# Patient Record
Sex: MALE | Race: WHITE
[De-identification: names, ages, dates, MRNs, and addresses within clinical notes are randomized per-mention and may not be internally consistent; named-entity substitution may affect disease eponyms.]

---

## 2017-01-03 ENCOUNTER — HOSPITAL ENCOUNTER (OUTPATIENT)
Dept: HOSPITAL 53 - M LAB REF | Age: 22
End: 2017-01-03
Attending: PHYSICIAN ASSISTANT
Payer: COMMERCIAL

## 2017-01-03 DIAGNOSIS — J02.9: Primary | ICD-10-CM

## 2020-12-30 ENCOUNTER — HOSPITAL ENCOUNTER (OUTPATIENT)
Dept: HOSPITAL 53 - M LAB | Age: 25
End: 2020-12-30
Attending: PSYCHIATRY & NEUROLOGY
Payer: COMMERCIAL

## 2020-12-30 DIAGNOSIS — G35: Primary | ICD-10-CM

## 2020-12-30 LAB
FOLATE SERPL-MCNC: 13.5 NG/ML
HBV SURFACE AB SER QL: NEGATIVE
HCV AB SER QL: 0.1 INDEX (ref ?–0.8)
RHEUMATOID FACT SERPL-ACNC: < 10 IU/ML (ref ?–15)
VIT B12 SERPL-MCNC: 310 PG/ML

## 2020-12-31 LAB
DRVVT CONFIRM PPP QL: 37.3 SEC
DRVVT SCREEN TO CONFIRM RATIO: 0.9 {RATIO} (ref 0–1.2)

## 2021-02-09 ENCOUNTER — HOSPITAL ENCOUNTER (OUTPATIENT)
Dept: HOSPITAL 53 - M PAIN | Age: 26
End: 2021-02-09
Attending: ANESTHESIOLOGY
Payer: COMMERCIAL

## 2021-02-09 DIAGNOSIS — F17.210: ICD-10-CM

## 2021-02-09 DIAGNOSIS — G35: Primary | ICD-10-CM

## 2021-02-09 DIAGNOSIS — G43.709: ICD-10-CM

## 2021-02-19 ENCOUNTER — HOSPITAL ENCOUNTER (OUTPATIENT)
Dept: HOSPITAL 53 - M LABSMTC | Age: 26
End: 2021-02-19
Attending: ANESTHESIOLOGY
Payer: COMMERCIAL

## 2021-02-19 DIAGNOSIS — Z20.822: ICD-10-CM

## 2021-02-19 DIAGNOSIS — Z01.812: Primary | ICD-10-CM

## 2021-02-24 ENCOUNTER — HOSPITAL ENCOUNTER (OUTPATIENT)
Dept: HOSPITAL 53 - M PAIN | Age: 26
End: 2021-02-24
Attending: ANESTHESIOLOGY
Payer: COMMERCIAL

## 2021-02-24 ENCOUNTER — HOSPITAL ENCOUNTER (OUTPATIENT)
Dept: HOSPITAL 53 - M LAB | Age: 26
End: 2021-02-24
Attending: FAMILY MEDICINE
Payer: COMMERCIAL

## 2021-02-24 DIAGNOSIS — G43.709: ICD-10-CM

## 2021-02-24 DIAGNOSIS — F17.210: ICD-10-CM

## 2021-02-24 DIAGNOSIS — G35: Primary | ICD-10-CM

## 2021-02-24 DIAGNOSIS — Z79.899: ICD-10-CM

## 2021-02-24 LAB
APPEARANCE CSF: CLEAR
COLOR CSF: COLORLESS
GLUCOSE CSF-MCNC: 57 MG/DL (ref 40–75)
PROT CSF-MCNC: 34 MG/DL (ref 15–45)
TUBE # CSF: (no result)

## 2021-02-24 PROCEDURE — 83916 OLIGOCLONAL BANDS: CPT

## 2021-02-24 PROCEDURE — 87205 SMEAR GRAM STAIN: CPT

## 2021-02-24 PROCEDURE — 99152 MOD SED SAME PHYS/QHP 5/>YRS: CPT

## 2021-02-24 PROCEDURE — 88313 SPECIAL STAINS GROUP 2: CPT

## 2021-02-24 PROCEDURE — 87102 FUNGUS ISOLATION CULTURE: CPT

## 2021-02-24 PROCEDURE — 89050 BODY FLUID CELL COUNT: CPT

## 2021-02-24 PROCEDURE — 87070 CULTURE OTHR SPECIMN AEROBIC: CPT

## 2021-02-24 PROCEDURE — 87252 VIRUS INOCULATION TISSUE: CPT

## 2021-02-24 PROCEDURE — 87483 CNS DNA AMP PROBE TYPE 12-25: CPT

## 2021-02-24 PROCEDURE — 62270 DX LMBR SPI PNXR: CPT

## 2021-02-24 PROCEDURE — 82945 GLUCOSE OTHER FLUID: CPT

## 2021-02-24 PROCEDURE — 82784 ASSAY IGA/IGD/IGG/IGM EACH: CPT

## 2021-02-24 PROCEDURE — 84157 ASSAY OF PROTEIN OTHER: CPT

## 2021-02-24 PROCEDURE — 88108 CYTOPATH CONCENTRATE TECH: CPT

## 2021-02-24 PROCEDURE — 99153 MOD SED SAME PHYS/QHP EA: CPT

## 2021-02-25 NOTE — ECWPNPC
PATIENT NAME: NOEL MIKE

: 1995

GENDER: MALE

MRN: [id number]

VISIT DATE: 2021

DISCHARGE DATE: 21 1507

VISIT LOCKED DATE TIME: 

PHYSICIAN: HALEY POTTER MD

RESOURCE: HALEY POTTER MD

 

           

           

REASON FOR APPOINTMENT

           

          1. SPINAL TAP

           

HISTORY OF PRESENT ILLNESS

           

      GENERAL:

           -.

           

      FALL RISK SCREENING:

      SCREENING

           

           

          :NO FALLS REPORTED IN THE LAST YEAR

           

      PAIN SCREENING:

      PATIENT HAS A COMPLAINT OF ACUTE OR CHRONIC PAIN

           

           

          :YES

          LOCATION OF PAIN:LOW BACK

          INTENSITY OF PAIN (SCALE OF 1 TO 10):4

          WHAT DOES YOUR PAIN FEEL LIKE:ACHING, CONTINOUS, SORE

          DURATION:CONTINOUS, CONSTANT

          PAIN IS INCREASED BY:ACTIVITIES

          PAIN IS DECREASED BY:USE OF PAIN MEDICATIONS MARIJUANA

           

      NURSING NOTE:

           -.

           

      PAIN CENTER INTAKE QUESTIONS:

      DO YOU HAVE A HISTORY OF MRSA?

           

           

          :NO

           

      DO YOU TAKE A BLOOD THINNERS?

           

           

          :NO

           

      DO YOU HAVE ANY BLEEDING DISORDERS?

           

           

          :NO

           

      ANY NEW NUMBNESS OR WEAKNESS IN YOUR LEGS OR ARMS?

           

           

          :NO

           

      ANY PACEMAKER,DEFIBRILLATOR, OR DORSAL COLUMN

          STIMULATOR?

           

           

          :NO

           

      DO YOU HAVE ANY RASHES OR OPEN SORES?

           

           

          :NO

           

      ARE YOU ALLERGIC TO IV DYE?

           

           

          :NO

           

      ARE YOU DIABETIC?

           

           

          :NO

           

      ANY NEW PROBLEMS WITH YOUR MEDICATIONS?

           

           

          :NO

           

      HAVE YOU RECEIVED A VACCINE IN THE PAST 30 DAYS?

           

           

          :NO

           

      DO YOU PLAN TO RECEIVE A VACCINE IN THE NEXT 21

          DAYS?

           

           

          :NO

           

      DO YOU TAKE ANY IMMUNOSUPPRESSIVE MEDICATIONS?

           

           

          :NO

           

      ANY HISTORY OF SEIZURES?

           

           

          :NO

           

      ANY HISTORY OF CARDIAC ISSUES OR EVENTS?

           

           

          :NO

           

      DO YOU HAVE SLEEP APNEA?

           

           

          :NO

           

      ANY RECENT HEAD INJURY?

           

           

          :NO

           

      DO YOU HAVE ANY NEW INFECTIONS?

           

           

          :NO

           

      IS THERE A CHANCE YOU COULD BE PREGNANT?

           

           

          :NO

           

      ARE YOU BREAST FEEDING?

           

           

          :NO

           

      WHEN DID YOU LAST EAT?

           

           

          : -2200

           

      WHEN DID YOU LAST DRINK?

           

           

          : - 1000

           

      WHAT DID YOU LAST DRINK?

           

           

          : -APPLE JUICE

           

      NAME OF PERSON DRIVING YOU HOME?

           

           

          : -BROTHER FAM

           

      DO YOU HAVE ANY OTHER QUESTIONS OR CONCERNS?

           

           

          : -

           

CURRENT MEDICATIONS

           

          TAKING EXCEDRIN MIGRAINE 250-250-65 MG TABLET 2 TABLETS ORALLY

          ONCE A DAY

          NOT-TAKING TOPAMAX 50 MG TABLET 1 TABLET ORALLY ONCE A DAY

           

PAST MEDICAL HISTORY

           

          SPINAL MENINGITIS

          MIGRAINES

           

ALLERGIES

           

          N.K.D.A.

           

SURGICAL HISTORY

           

          OBJECT REMOVED FROM EAR AS A CHILD

           

FAMILY HISTORY

           

          FATHER: 

          MOTHER: ALIVE, DIAGNOSED WITH HYPERTENSION

          2 BROTHER(S) - HEALTHY.

           

SOCIAL HISTORY

           

          GENERAL:

           

          TOBACCO USE

          ARE YOU A:CURRENT SMOKER

          HOW MANY CIGARETTES A DAY DO YOU SMOKE?11-20

          ARE YOU INTERESTED IN QUITTING?NOT READY TO QUIT

          COUNSELED THE PATIENT ON SMOKING EFFECTS, EDUCATION

          [id number]/09/2021

           

           

          LATEX QUESTIONNAIRE

          LATEX ALLERGY : HAVE YOU EVER DEVELOPED ANY TYPE OF REACTION

          AFTER HANDLING LATEX PRODUCTS SUCH AS RUBBER GLOVES, CONDOMS,

          DIAPHRAGMS, BALLOONS, SOCKS, OR UNDERWEAR?NO

          LATEX ALLERGY : HAVE YOU EVER DEVELOPED ANY TYPE OF REACTION

          DURING OR AFTER DENTAL APPOINTMENT, VAGINAL/RECTAL EXAMINATION,

          SURGICAL PROCEDURE, OR ANY OTHER EXPOSURE?NO

          LATEX RISK : HAVE YOU EVER HAD ANY DIFFICULTY BREATHING OR HIVES

          AFTER EATING OR HANDLING ANY FRUITS, OR VEGETABLES; SUCH AS KIWI,

          BANANAS, STONE FRUITS, OR CHESTNUTSNO

          LATEX RISK : DO YOU HAVE A PREVIOUS PERSONAL HISTORY OF MORE THAN

          NINE SURGERIES, SPINA BIFIDA, OR REPEATED CATHERIZATIONS? NO

          LATEX RISK : ARE YOU FREQUENTLY EXPOSED TO LATEX PRODUCTS IN YOUR

          OCCUPATION?NO

          DATE ASKED : 2021

           

           

          ALCOHOL SCREENING

          DID YOU HAVE A DRINK CONTAINING ALCOHOL IN THE PAST YEAR?YES

          HOW MANY DRINKS DID YOU HAVE ON A TYPICAL DAY WHEN YOU WERE

          DRINKING IN THE PAST YEAR?1 OR 2 (0 POINTS)

          HOW OFTEN DID YOU HAVE A DRINK CONTAINING ALCOHOL IN THE PAST

          YEAR?MONTHLY OR LESS (1 POINT)

          POINTS1

          INTERPRETATIONNEGATIVE

           

           

          RECREATIONAL DRUG USE

          DRUG USE?YES

          HOW OFTEN AND HOW MUCH? MARIJUANA, DAILY

           

           

          LEARNING BARRIERS / SPECIAL NEEDS

          CHANGE FROM LAST VISIT?NO

          BARRIERS TO LEARNING?NO

          HEARING IMPAIRED?NO

          VISION IMPAIRED?YES

          :CORRECTIVE LENSES

          COGNITIVELY IMPAIRED?NO

          READINESS TO LEARN?YES

          LEARNING PREFERENCES?NO

          LEARNING CAPABILITIES PRESENT?YES

          EMOTIONAL BARRIERS?NO

          SPECIAL DEVICES?NO

           

           

          DOMESTIC VIOLENCE

          DO YOU FEEL SAFE IN YOUR ENVIRONMENT?YES

           

           

          -

          HAS THE PATIENT BEEN EDUCATED REGARDING HIS/HER PLAN OF CARE?YES

          HAS THE PATIENT BEEN EDUCATED REGARDING PAIN, THE RISK FOR PAIN,

          THE IMPORTANCE OF EFFECTIVE PAIN MANAGEMENT, AND THE PAIN

          ASSESSMENT PROCESS?YES

           

HOSPITALIZATION/MAJOR DIAGNOSTIC PROCEDURE

           

          EAR SURGERY

          SPINAL MENINGITIS

           

VITAL SIGNS

           

          .8 LBS, HT 60 IN, BMI 38.82 INDEX, /63 MM HG, HR 73

          /MIN, RR 18 /MIN, TEMP 98.1 F, OXYGEN SAT % 98%, SAFE IN ENV?

          (Y/N) YES, NA INITIALS SC 12:34, REVIEWED BY: HARDEEP THORNTON.

           

EXAMINATION

           

      GENERAL EXAMINATION: A HISTORY AND PHYSICAL EXAM ON

          THE PATIENT WAS DONE ON 2021 (DATE OF ORIGINAL ASSESSMENT)

          IN PREPARATION OF SURGERY/PROCEDURE. I HAVE NOW REASSESSED THIS

          PATIENT'S HEALTH STATUS AND PERFORMED AN UPDATED EXAM TODAY. ALL

          CHANGES IN THE PATIENT'S HISTORY, PHYSICAL EXAM, PRE-EXISTING

          CONDITONS, AND INDICATIONS/CONTRAINDICATIONS TO THE PLANNED

          PROCEDURE AND ANESTHESIA ARE DOCUMENTED AND EVALUATED BELOW. I

          ATTEST TO THE ADEQUACY AND APPROPRIATENESS OF MY ASSESSMENT, AND

          CONFIRM THE NECESSITY FOR THE PLANNED PROCEDURE. THE PATIENT IS

          ALERT, ORIENTED TIMES THREE AND COOPERATIVE. LUNGS ARE CLEAR TO

          AUSCULTATION. HEART SHOWS REGULAR RHYTHM, NO MURMURS AND NO

          GALLOPS.

           

ASSESSMENTS

           

          MULTIPLE SCLEROSIS - G35 (PRIMARY), RULE OUT

           

          CHRONIC MIGRAINE - G43.709

           

TREATMENT

           

      MULTIPLE SCLEROSIS

          MEDICATION: FENTANYL CITRATE 25MCG IVDILEMIKA DWYER

          2021 02:10:51 PM - SECOND DOSE ORDERED, VERIFIED WITH NAIMA AGUILAR 2021 2:47:32 PM > 1ST DOSE AT 1408,

          2ND DOSE AT 1410 FOR A TOTAL DOSE  MCG WITH 50 MCG DOSE

          COMPLETION OF PROCEDURAL VISIT WHEN MEETS CRITERIA

          MED: VERSED 1MG IV MIDAZOLAMMEEKRMLULA,[facility] 2021 1:13:32 PM >

          VERIFIED DILEMIKA DWYER 2021 02:08:27 PM - SECOND DOSE

          ORDERED, VERIFIED WITH NAIMA AGUILAR 2021

          2:48:19 PM > 1 ST DOSE AT 1405 2 ND DOSE AT 1408 FOR A TOTAL OF 2

          MG

          MEDICATION: FENTANYL CITRATE 50MCG IV RC,[facility] 2021

          1:13:59 PM > VERIFIED NAIMA SOLANO 2021 2:46:32 PM > 1ST

          DOSE AT 1405 FOR A TOTAL DOSE  MCG WITH THE 25MCG DOSES

          OXYGEN AT 2 LITERS PER NASAL CANNULAMEEKLULA,[facility] 2021 2:59:28

          PM > )2 2L N/C APPLIED AT 1335 AND REMOVED 1430.

          IV LACTATED RINGER'S AT KVNAIMA FRIEND 2021 2:45:01 PM >

          2021 1320 #22 IV STARTED IN RIGHT AC X 2 ND ATTEMPT BY THIS

          WRITER, SITE ASYMPTOMATIC, LR INFUSING AT KVO WITHOUT DIFFICULTY

          NAIMA SOLANO 2021 2:45:17 PM > 350 CC LR INFUSED TOTAL

           

PROCEDURES

           

      PAIN NURSING RECORD

          PROCEDURE    IN ROOM 1330, PHYSICIAN IN ROOM 1400, START 1413,

          FINISH 1428, PHYSICIAN OUT OF ROOM 1430, OUT OF ROOM 1435, ECG

          NORMAL SINUS, PATIENT SHIELDED N/A, SAFETY STRAP N/A, PREP

          BETADINE BY DR POTTER, DRESSING TEGADERM BY DR POTTER

          LOC:    1. ALERT, ORIENTED 1415 , 2. DROWSY, RESPONDS

          APPROPRIATELY 1430, 1. ALERT, ORIENTED

          RESP:    1. REGULAR, NO DYSPNEA

          COLOR:    1. PINK

          SKIN:    1. WARM, DRY

          POSITION:    3. LATERAL LEFT SIDE DOWN

          [facility]:    [facility] 1[facility] PM > 118/73 HR 63 16

          100% 2L N/C , [facility] 2021 1:45:19 PM > 115/74 HR 73 16

          100% 2L N/C , [facility] 2021 1:50:28 PM > 116/67 HR 72 16

          96% 2L N/C , [facility] 2021 1:55:23 PM > 114/70 HR 64 16

          98% 2L N/C , [facility] 2021 2:00:31 PM > 118/72 HR 67 16

          100% 2L N/C , [facility] 2021 2:05:41 PM > 114/70 HR 63 16

          99% 2L N/C , [facility] 2021 2:10:15 PM > 114/70 HR 59 16

          99% 2L N/C  [facility] 2021 2:15:52 PM > 112/66 HR 60 16

          99% 2L N/C , [facility] 2021 2:20:33 PM > 115/71 HR 66 16

          99% 2L N/C , [facility] 2021 2:25:19 PM > 108/65 HR 63 16

          99% 2L N/C , [facility] 2021 2:30:50 PM > 116/71 HR 64 16

          98% R/A , [facility] 2021 2:50:42 PM > 114//68 HR 74 16 97%

          R/A

          NOTES    CEREBRALSPINAL FLUID TO LAB PER ORDERS.

          COMPLETION OF PROCEDURE APPOINTMENT:    POST PAIN 0, DRESSING

          SITE DRY AND INTACT, IV DISCONTINUED, SITE CLEAR, CATHETER INTACT

          PATIENT FROM PROCEDURE ROOM TO RECOVERY VIA STRETCHER. PATIENT

          DISCHARGED TO CAR VIA WHEELCHAIR., GAIT STEADY, TEACHING

          COMPLETED, PATIENT ACKNOWLEDGES UNDERSTANDING YES, PROCEDURE

          APPOINTMENT COMPLETED AT 1458

           

          PRE PROCEDURE DIAGNOSIS    NEUROLOGICAL SYMPTOMS, CHRONIC

          MIGRAINES AND RULE OUT MULTIPLE SCLEROSIS

          PROCEDURE    SPINAL TAP

          PRE PROCEDURE NOTE    THE PATIENT HAS A HISTORY OF NEUROLOGICAL

          SYMPTOMS. THE PATIENT WAS REFERRED HERE BY DR. FLORES FOR A SPINAL

          TAP FOR CHRONIC MIGRAINES AND TO RULE OUT MULTIPLE SCLEROSIS. THE

          PATIENT WAS INTERVIEWED, EXAMINED AND TREATMENT QUESTIONNAIRES

          REVIEWED. THE PROCEDURE, RISKS AND BENEFITS WERE DISCUSSED WITH

          THE PATIENT. I DISCUSSED WITH THE PATIENT THAT I WILL NOT RECEIVE

          THE RESULTS FROM THIS TEST AND THAT THEY WILL HAVE TO FOLLOW UP

          WITH DR. FLORES FOR THE RESULTS. THE PATIENT WOULD LIKE TO MOVE

          FORWARD WITH IV SEDATION DUE TO ANXIETY AND DISCOMFORT ASSOCIATED

          WITH THE PROCEDURE. THE PATIENT DENIES UNEXPLAINABLE, WEIGHT

          LOSS, FEVER, CHILLS, OR CHANGES IN URINARY OR BOWEL CONTROL.

          PATIENT IS COVID-19 NEGATIVE.

          DESCRIPTION OF PROCEDURE    AFTER CONSENT WAS TAKEN, THE PATIENT

          WAS BROUGHT TO THE PROCEDURE ROOM AND PLACED IN THE LEFT LATERAL

          POSITION. THE LUMBOSACRAL AREA WAS CLEANED WITH BETADINE SOLUTION

          AND DRAPED ASEPTICALLY. THE PROCEDURE WAS DONE UNDER STERILE

          CONDITIONS. A TIMEOUT WAS PERFORMED WHERE THE CONSENTED SITE WAS

          VERIFIED WITH EVERYONE IN THE ROOM. LOCAL INFILTRATE 1 %

          LIDOCAINE WAS USED TO NUMB THE SKIN AND SUBCUTANEOUS TISSUE BELOW

          IT AT THE APPROXIMATELY THE L4-L5 INTERSPACE. A 22-GAUGE QUINCKE

          NEEDLE WAS ADVANCED UNTIL THE DURA WAS FELT AND CSF WAS

          FREE-FLOWING. THERE WAS NO BLOOD RETURN ENCOUNTERED. NO

          PARESTHESIA WAS ENCOUNTERED. OPENING PRESSURE WAS MEASURED AT 27

          CM OF WATER. 4 VIALS OF 3 ML OF CSF WAS COLLECTED. CSF WAS CLEAR.

          CSF WAS SENT FOR STUDIES AS ORDERED BY THE REFERRING PHYSICIAN.

          VITAL SIGNS WERE STABLE. THERE WERE NO COMPLICATIONS. ESTIMATED

          BLOOD LOSS WAS LESS THAN 5 ML. THE PATIENT WAS SENT TO THE

          RECOVERY ROOM WHERE HE WAS MOVING HIS EXTREMITIES AND DOING WELL.

          THE PATIENT RECEIVED VERSED 2 MG AND FENTANYL 100 MCG IV IN

          DIVIDED DOSES. FACE TO FACE START TIME: 1405 FACE TO FACE END

          TIME: 1431. TOTAL FACE TO FACE TIME: 26 MINUTES

          POST PROCEDURE NOTE    I DISCUSSED THE PROCEDURE WITH THE

          PATIENT. I WILL SEND THE CSF FOR STUDIES. THE PATIENT WILL FOLLOW

          UP WITH DR. FLORES. THE PATIENT WILL CALL OUR OFFICE AS NEEDED. I,

          MIKA VEGA, DOCUMENTED THE ABOVE INFORMATION ACTING AS A

          SCRIBE FOR DR. POTTER. I HAVE REVIEWED THE ABOVE DOCUMENT,

          WRITTEN BY MIKA VEGA, SCRIBE, AND I VERIFY THAT IT IS

          ACCURATE.

           

PROCEDURE CODES

           

          69610 SPINAL FLUID TAP DIAGNOSTIC

           

          68066 MOD SED SAME PHYS/QHP 5/>YRS

           

          87610 MOD SED SAME PHYS/QHP EA

           

DISPOSITION & COMMUNICATION

           

FOLLOW UP

           

          FOLLOW UP WITH NEUROLOGIST. (REASON: CALL OUR OFFICE IF NEEDED.)

           

 

ELECTRONICALLY SIGNED BY HALEY POTTER MD, MD ON

          2021 AT 03:24 PM EST

           

           

           

 

DISCLAIMER :

THIS IS A VISIT SUMMARY EXTRACTED FROM THE [employer] CHART.

IT IS NOT A COPY OF THE [employer] PROGRESS NOTE.

CLAUDE